# Patient Record
Sex: FEMALE | URBAN - METROPOLITAN AREA
[De-identification: names, ages, dates, MRNs, and addresses within clinical notes are randomized per-mention and may not be internally consistent; named-entity substitution may affect disease eponyms.]

---

## 2020-07-25 ENCOUNTER — TELEPHONE ENCOUNTER (OUTPATIENT)
Dept: URBAN - METROPOLITAN AREA CLINIC 13 | Facility: CLINIC | Age: 70
End: 2020-07-25

## 2020-07-25 RX ORDER — OMEPRAZOLE 40 MG/1
TAKE ONE CAPSULE BY MOUTH TWICE DAILY CAPSULE, DELAYED RELEASE ORAL
Qty: 180 | Refills: 3 | OUTPATIENT
Start: 2019-06-04 | End: 2019-08-15

## 2020-07-25 RX ORDER — CIPROFLOXACIN HYDROCHLORIDE 500 MG/1
TAKE 1 TABLET TWICE DAILY TABLET, FILM COATED ORAL
Qty: 20 | Refills: 0 | OUTPATIENT
Start: 2019-04-16 | End: 2019-05-07

## 2020-07-25 RX ORDER — METRONIDAZOLE 500 MG/1
TAKE 1 TABLET 3 TIMES DAILY TABLET ORAL
Qty: 30 | Refills: 0 | OUTPATIENT
Start: 2019-04-16 | End: 2019-05-07

## 2020-07-25 RX ORDER — DICYCLOMINE HYDROCHLORIDE 10 MG/1
TAKE 1 CAPSULE EVERY 6 HOURS AS NEEDED CAPSULE ORAL
Qty: 28 | Refills: 0 | OUTPATIENT
Start: 2019-04-16 | End: 2020-01-20

## 2020-07-25 RX ORDER — ESOMEPRAZOLE MAGNESIUM 40 MG/1
TAKE 1 CAPSULE BY MOUTH TWICE A DAY CAPSULE, DELAYED RELEASE PELLETS ORAL
Qty: 180 | Refills: 3 | OUTPATIENT
Start: 2019-08-14 | End: 2019-08-15

## 2020-07-25 RX ORDER — LOSARTAN POTASSIUM AND HYDROCHLOROTHIAZIDE 50; 12.5 MG/1; MG/1
TAKE 1 TABLET BY MOUTH EVERY DAY TABLET, FILM COATED ORAL
Qty: 90 | Refills: 0 | OUTPATIENT
Start: 2019-02-25 | End: 2019-12-17

## 2020-07-25 RX ORDER — BISOPROLOL FUMARATE AND HYDROCHLOROTHIAZIDE 6.25; 2.5 MG/1; MG/1
TAKE 1 TABLET DAILY TABLET ORAL
Refills: 0 | OUTPATIENT
Start: 2006-03-23 | End: 2010-11-02

## 2020-07-25 RX ORDER — OMEPRAZOLE 40 MG/1
TAKE 1 CAPSULE BY MOUTH TWICE A DAY CAPSULE, DELAYED RELEASE ORAL
Qty: 60 | Refills: 11 | OUTPATIENT
Start: 2019-05-07 | End: 2019-06-04

## 2020-07-25 RX ORDER — FERROUS FUMARATE 324(106)MG
TAKE 1 TABLET DAILY AS DIRECTED TABLET ORAL
Refills: 0 | OUTPATIENT
Start: 2007-10-05 | End: 2010-11-02

## 2020-07-26 ENCOUNTER — TELEPHONE ENCOUNTER (OUTPATIENT)
Dept: URBAN - METROPOLITAN AREA CLINIC 13 | Facility: CLINIC | Age: 70
End: 2020-07-26

## 2020-07-26 RX ORDER — ALLOPURINOL 300 MG/1
TAKE 1 TABLET BY MOUTH EVERY DAY INSTEAD OF ULORIC TABLET ORAL
Refills: 0 | Status: ACTIVE | COMMUNITY
Start: 2018-09-25

## 2020-07-26 RX ORDER — HYDROCODONE BITARTRATE AND ACETAMINOPHEN 5; 325 MG/1; MG/1
TAKE 1-2 TABLETS BY MOUTH EVERY 4-6 HOURS AS NEEDED FOR PAIN TABLET ORAL
Qty: 26 | Refills: 0 | Status: ACTIVE | COMMUNITY
Start: 2018-09-10

## 2020-07-26 RX ORDER — POTASSIUM CHLORIDE 1500 MG/1
TAKE 1 TABLET BY MOUTH EVERY DAY TABLET, EXTENDED RELEASE ORAL
Refills: 0 | Status: ACTIVE | COMMUNITY
Start: 2018-08-26

## 2020-07-26 RX ORDER — LEVOTHYROXINE SODIUM 0.09 MG/1
TAKE 1 TABLET BY MOUTH EVERY DAY TABLET ORAL
Qty: 90 | Refills: 0 | Status: ACTIVE | COMMUNITY
Start: 2019-11-13

## 2020-07-26 RX ORDER — SERTRALINE HYDROCHLORIDE 112 UG/1
TAKE 1 TABLET DAILY TABLET ORAL
Refills: 0 | Status: ACTIVE | COMMUNITY
Start: 2018-08-26

## 2020-07-26 RX ORDER — CYCLOBENZAPRINE HYDROCHLORIDE 10 MG/1
TAKE 1 TABLET BY MOUTH EVERY 8 HOURS AS NEEDED FOR MUSCLE SPASM TABLET, FILM COATED ORAL
Qty: 5 | Refills: 0 | Status: ACTIVE | COMMUNITY
Start: 2018-07-31

## 2020-07-26 RX ORDER — HYDROCODONE BITARTRATE AND ACETAMINOPHEN 5; 325 MG/1; MG/1
TAKE 1 TO 2 TABLETS BY MOUTH EVERY 4 TO 6 HOURS AS NEEDED FOR PAIN *IN TABLET ORAL
Qty: 26 | Refills: 0 | Status: ACTIVE | COMMUNITY
Start: 2018-08-15

## 2020-07-26 RX ORDER — OXYCODONE AND ACETAMINOPHEN 7.5; 325 MG/1; MG/1
TAKE 1 TABLET BY MOUTH EVERY 4 HOURS AS NEEDED PAIN TABLET ORAL
Qty: 26 | Refills: 0 | Status: ACTIVE | COMMUNITY
Start: 2018-07-09

## 2020-07-26 RX ORDER — DOCUSATE SODIUM 100 MG/1
TAKE ONE CAPSULE BY MOUTH EVERY DAY CAPSULE, LIQUID FILLED ORAL
Qty: 10 | Refills: 0 | Status: ACTIVE | COMMUNITY
Start: 2018-07-15

## 2020-07-26 RX ORDER — FUROSEMIDE 40 MG/1
TAKE 1 TABLET BY MOUTH TWICE A DAY TABLET ORAL
Refills: 0 | Status: ACTIVE | COMMUNITY
Start: 2018-03-19

## 2020-07-26 RX ORDER — LEVOTHYROXINE SODIUM 0.09 MG/1
TAKE 1 TABLET BY MOUTH EVERY DAY TABLET ORAL
Qty: 90 | Refills: 0 | Status: ACTIVE | COMMUNITY
Start: 2019-03-07

## 2020-07-26 RX ORDER — APIXABAN 5 MG/1
TAKE 1 TABLET TWO TIMES DAILY TABLET, FILM COATED ORAL
Refills: 0 | Status: ACTIVE | COMMUNITY
Start: 2018-09-25

## 2020-07-26 RX ORDER — MINOCYCLINE HYDROCHLORIDE 100 MG/1
TAKE 1 CAPSULE BY MOUTH EVERY DAY WITH FULL GLASS OF WATER CAPSULE ORAL
Qty: 30 | Refills: 0 | Status: ACTIVE | COMMUNITY
Start: 2019-03-04

## 2020-07-26 RX ORDER — CARISOPRODOL 350 MG/1
TAKE 1 TABLET BY MOUTH AT BEDTIME AS NEEDED TABLET ORAL
Qty: 30 | Refills: 0 | Status: ACTIVE | COMMUNITY
Start: 2018-07-09

## 2020-07-26 RX ORDER — HYDROCODONE BITARTRATE AND ACETAMINOPHEN 10; 325 MG/1; MG/1
TAKE 1 TABLET BY MOUTH 3 TIMES A DAY AS NEEDED FOR PAIN TABLET ORAL
Qty: 10 | Refills: 0 | Status: ACTIVE | COMMUNITY
Start: 2018-06-29

## 2020-07-26 RX ORDER — CEPHALEXIN 500 MG/1
TAKE 1 TABLET BY MOUTH 4 TIMES DAILY X 10 DAYS CAPSULE ORAL
Qty: 40 | Refills: 0 | Status: ACTIVE | COMMUNITY
Start: 2018-08-06

## 2020-07-26 RX ORDER — FEBUXOSTAT 80 MG/1
TAKE 1 TABLET BY MOUTH DAILY TABLET ORAL
Qty: 30 | Refills: 0 | Status: ACTIVE | COMMUNITY
Start: 2018-02-27

## 2020-07-26 RX ORDER — OXYCODONE AND ACETAMINOPHEN 5; 325 MG/1; MG/1
TAKE 1 TABLET BY MOUTH THREE TIMES A DAY AS NEEDED FOR PAIN TABLET ORAL
Qty: 10 | Refills: 0 | Status: ACTIVE | COMMUNITY
Start: 2018-06-25

## 2020-07-26 RX ORDER — HYDROCODONE BITARTRATE AND ACETAMINOPHEN 5; 325 MG/1; MG/1
TAKE 1 TABLET BY MOUTH EVERY 4 HOURS AS NEEDED FOR PAIN TABLET ORAL
Qty: 15 | Refills: 0 | Status: ACTIVE | COMMUNITY
Start: 2018-07-15

## 2020-07-26 RX ORDER — CHLORHEXIDINE GLUCONATE 4 %
TAKE 1 TABLET DAILY AS DIRECTED LIQUID (ML) TOPICAL
Refills: 0 | Status: ACTIVE | COMMUNITY

## 2020-07-26 RX ORDER — BENZONATATE 200 MG/1
TAKE 1 CAPSULE BY MOUTH THREE TIMES A DAY AS NEEDED CAPSULE ORAL
Qty: 60 | Refills: 0 | Status: ACTIVE | COMMUNITY
Start: 2019-10-10

## 2020-07-26 RX ORDER — BENZONATATE 100 MG/1
TAKE 1 TO 2 CAPSULES BY MOUTH 3 TIMES A DAY WITH THE LAST DOSE AT BEDT CAPSULE ORAL
Qty: 60 | Refills: 0 | Status: ACTIVE | COMMUNITY
Start: 2018-12-03

## 2020-07-26 RX ORDER — OXYCODONE AND ACETAMINOPHEN 5; 325 MG/1; MG/1
TAKE 1 TO 2 TABLETS BY MOUTH EVERY 4-6 HOURS AS NEEDED TABLET ORAL
Qty: 26 | Refills: 0 | Status: ACTIVE | COMMUNITY
Start: 2018-08-06

## 2020-07-26 RX ORDER — SULFAMETHOXAZOLE AND TRIMETHOPRIM 800; 160 MG/1; MG/1
TAKE 1 TABLET BY MOUTH TWICE A DAY X 10 DAYS TABLET ORAL
Qty: 20 | Refills: 0 | Status: ACTIVE | COMMUNITY
Start: 2018-08-06

## 2020-07-26 RX ORDER — SERTRALINE HYDROCHLORIDE 112 UG/1
TABLET ORAL
Qty: 30 | Refills: 0 | Status: ACTIVE | COMMUNITY
Start: 2019-09-10

## 2020-07-26 RX ORDER — PANTOPRAZOLE SODIUM 40 MG/1
TAKE 1 TABLET BY MOUTH TWICE A DAY TABLET, DELAYED RELEASE ORAL
Qty: 180 | Refills: 1 | Status: ACTIVE | COMMUNITY
Start: 2019-08-15

## 2020-07-26 RX ORDER — CARVEDILOL 25 MG/1
TAKE 1 TABLET BY MOUTH TWICE A DAY TABLET, FILM COATED ORAL
Qty: 180 | Refills: 0 | Status: ACTIVE | COMMUNITY
Start: 2018-09-25

## 2020-07-26 RX ORDER — EVOLOCUMAB 140 MG/ML
INJECTION, SOLUTION SUBCUTANEOUS
Qty: 2 | Refills: 0 | Status: ACTIVE | COMMUNITY
Start: 2019-03-19

## 2020-07-26 RX ORDER — NAPROXEN 500 MG/1
TABLET ORAL
Qty: 60 | Refills: 0 | Status: ACTIVE | COMMUNITY
Start: 2019-05-15

## 2020-07-26 RX ORDER — OSELTAMIVIR PHOSPHATE 75 MG/1
TAKE 1 CAPSULE BY MOUTH TWICE A DAY CAPSULE ORAL
Qty: 10 | Refills: 0 | Status: ACTIVE | COMMUNITY
Start: 2018-12-03

## 2020-07-26 RX ORDER — MINOCYCLINE HYDROCHLORIDE 100 MG/1
TAKE 1 CAPSULE BY MOUTH EVERY DAY CAPSULE ORAL
Qty: 30 | Refills: 0 | Status: ACTIVE | COMMUNITY
Start: 2018-06-07

## 2020-07-26 RX ORDER — LEVOTHYROXINE SODIUM 0.09 MG/1
TAKE 1 TABLET BY MOUTH EVERY DAY TABLET ORAL
Qty: 30 | Refills: 0 | Status: ACTIVE | COMMUNITY
Start: 2019-01-07

## 2021-02-10 ENCOUNTER — ERX REFILL RESPONSE (OUTPATIENT)
Age: 71
End: 2021-02-10

## 2021-02-10 RX ORDER — PANTOPRAZOLE SODIUM 40 MG/1
TAKE 1 TABLET BY MOUTH TWICE A DAY TABLET, DELAYED RELEASE ORAL
Qty: 180 | Refills: 0

## 2021-02-22 ENCOUNTER — ERX REFILL RESPONSE (OUTPATIENT)
Dept: URBAN - METROPOLITAN AREA CLINIC 113 | Facility: CLINIC | Age: 71
End: 2021-02-22

## 2021-02-22 RX ORDER — PANTOPRAZOLE SODIUM 40 MG/1
TAKE 1 TABLET BY MOUTH TWICE A DAY TABLET, DELAYED RELEASE ORAL
Qty: 180 | Refills: 0

## 2021-04-11 ENCOUNTER — TELEPHONE ENCOUNTER (OUTPATIENT)
Dept: URBAN - METROPOLITAN AREA CLINIC 113 | Facility: CLINIC | Age: 71
End: 2021-04-11

## 2021-04-15 ENCOUNTER — WEB ENCOUNTER (OUTPATIENT)
Dept: URBAN - METROPOLITAN AREA CLINIC 107 | Facility: CLINIC | Age: 71
End: 2021-04-15

## 2021-04-15 ENCOUNTER — OFFICE VISIT (OUTPATIENT)
Dept: URBAN - METROPOLITAN AREA CLINIC 107 | Facility: CLINIC | Age: 71
End: 2021-04-15
Payer: COMMERCIAL

## 2021-04-15 VITALS
DIASTOLIC BLOOD PRESSURE: 62 MMHG | HEART RATE: 78 BPM | HEIGHT: 64 IN | WEIGHT: 169 LBS | SYSTOLIC BLOOD PRESSURE: 95 MMHG | RESPIRATION RATE: 18 BRPM | TEMPERATURE: 97.7 F | BODY MASS INDEX: 28.85 KG/M2

## 2021-04-15 DIAGNOSIS — K22.70 BARRETT'S ESOPHAGUS WITHOUT DYSPLASIA: ICD-10-CM

## 2021-04-15 DIAGNOSIS — D64.89 ANEMIA DUE TO OTHER CAUSE: ICD-10-CM

## 2021-04-15 DIAGNOSIS — K21.9 GASTROESOPHAGEAL REFLUX DISEASE WITHOUT ESOPHAGITIS: ICD-10-CM

## 2021-04-15 DIAGNOSIS — K60.2 ANAL FISSURE: ICD-10-CM

## 2021-04-15 PROCEDURE — 99213 OFFICE O/P EST LOW 20 MIN: CPT | Performed by: NURSE PRACTITIONER

## 2021-04-15 RX ORDER — HYDROCODONE BITARTRATE AND ACETAMINOPHEN 10; 325 MG/1; MG/1
TAKE 1 TABLET BY MOUTH 3 TIMES A DAY AS NEEDED FOR PAIN TABLET ORAL
Qty: 10 | Refills: 0 | Status: DISCONTINUED | COMMUNITY
Start: 2018-06-29

## 2021-04-15 RX ORDER — BENZONATATE 200 MG/1
TAKE 1 CAPSULE BY MOUTH THREE TIMES A DAY AS NEEDED CAPSULE ORAL
Qty: 60 | Refills: 0 | Status: DISCONTINUED | COMMUNITY
Start: 2019-10-10

## 2021-04-15 RX ORDER — CARVEDILOL 25 MG/1
TAKE 1 TABLET BY MOUTH TWICE A DAY TABLET, FILM COATED ORAL
Qty: 180 | Refills: 0 | Status: ACTIVE | COMMUNITY
Start: 2018-09-25

## 2021-04-15 RX ORDER — OSELTAMIVIR PHOSPHATE 75 MG/1
TAKE 1 CAPSULE BY MOUTH TWICE A DAY CAPSULE ORAL
Qty: 10 | Refills: 0 | Status: DISCONTINUED | COMMUNITY
Start: 2018-12-03

## 2021-04-15 RX ORDER — PANTOPRAZOLE SODIUM 40 MG/1
TAKE 1 TABLET BY MOUTH TWICE A DAY TABLET, DELAYED RELEASE ORAL
Qty: 180 | Refills: 0 | Status: ACTIVE | COMMUNITY

## 2021-04-15 RX ORDER — MINOCYCLINE HYDROCHLORIDE 100 MG/1
TAKE 1 CAPSULE BY MOUTH EVERY DAY CAPSULE ORAL
Qty: 30 | Refills: 0 | Status: DISCONTINUED | COMMUNITY
Start: 2018-06-07

## 2021-04-15 RX ORDER — SULFAMETHOXAZOLE AND TRIMETHOPRIM 800; 160 MG/1; MG/1
TAKE 1 TABLET BY MOUTH TWICE A DAY X 10 DAYS TABLET ORAL
Qty: 20 | Refills: 0 | Status: DISCONTINUED | COMMUNITY
Start: 2018-08-06

## 2021-04-15 RX ORDER — POTASSIUM CHLORIDE 1.5 G/1.58G
1 PACKET WITH FOOD POWDER, FOR SOLUTION ORAL ONCE A DAY
Status: ACTIVE | COMMUNITY

## 2021-04-15 RX ORDER — CARISOPRODOL 350 MG/1
TAKE 1 TABLET BY MOUTH AT BEDTIME AS NEEDED TABLET ORAL
Qty: 30 | Refills: 0 | Status: DISCONTINUED | COMMUNITY
Start: 2018-07-09

## 2021-04-15 RX ORDER — CHLORHEXIDINE GLUCONATE 4 %
TAKE 1 TABLET DAILY AS DIRECTED LIQUID (ML) TOPICAL
Refills: 0 | Status: ACTIVE | COMMUNITY

## 2021-04-15 RX ORDER — POTASSIUM CHLORIDE 1500 MG/1
TAKE 1 TABLET BY MOUTH EVERY DAY TABLET, EXTENDED RELEASE ORAL
Refills: 0 | Status: DISCONTINUED | COMMUNITY
Start: 2018-08-26

## 2021-04-15 RX ORDER — OXYCODONE AND ACETAMINOPHEN 5; 325 MG/1; MG/1
TAKE 1 TABLET BY MOUTH THREE TIMES A DAY AS NEEDED FOR PAIN TABLET ORAL
Qty: 10 | Refills: 0 | Status: DISCONTINUED | COMMUNITY
Start: 2018-06-25

## 2021-04-15 RX ORDER — DOCUSATE SODIUM 100 MG/1
TAKE ONE CAPSULE BY MOUTH EVERY DAY CAPSULE, LIQUID FILLED ORAL
Qty: 10 | Refills: 0 | Status: DISCONTINUED | COMMUNITY
Start: 2018-07-15

## 2021-04-15 RX ORDER — FEBUXOSTAT 80 MG/1
TAKE 1 TABLET BY MOUTH DAILY TABLET ORAL
Qty: 30 | Refills: 0 | Status: DISCONTINUED | COMMUNITY
Start: 2018-02-27

## 2021-04-15 RX ORDER — SERTRALINE HYDROCHLORIDE 112 UG/1
TAKE 1 TABLET DAILY TABLET ORAL
Refills: 0 | Status: ACTIVE | COMMUNITY
Start: 2018-08-26

## 2021-04-15 RX ORDER — HYDROCODONE BITARTRATE AND ACETAMINOPHEN 5; 325 MG/1; MG/1
TAKE 1 TABLET BY MOUTH EVERY 4 HOURS AS NEEDED FOR PAIN TABLET ORAL
Qty: 15 | Refills: 0 | Status: DISCONTINUED | COMMUNITY
Start: 2018-07-15

## 2021-04-15 RX ORDER — APIXABAN 5 MG/1
TAKE 1 TABLET TWO TIMES DAILY TABLET, FILM COATED ORAL
Refills: 0 | Status: ACTIVE | COMMUNITY
Start: 2018-09-25

## 2021-04-15 RX ORDER — FUROSEMIDE 40 MG/1
TAKE 1 TABLET BY MOUTH TWICE A DAY TABLET ORAL
Refills: 0 | Status: ACTIVE | COMMUNITY
Start: 2018-03-19

## 2021-04-15 RX ORDER — CEPHALEXIN 500 MG/1
TAKE 1 TABLET BY MOUTH 4 TIMES DAILY X 10 DAYS CAPSULE ORAL
Qty: 40 | Refills: 0 | Status: DISCONTINUED | COMMUNITY
Start: 2018-08-06

## 2021-04-15 RX ORDER — BENZONATATE 100 MG/1
TAKE 1 TO 2 CAPSULES BY MOUTH 3 TIMES A DAY WITH THE LAST DOSE AT BEDT CAPSULE ORAL
Qty: 60 | Refills: 0 | Status: DISCONTINUED | COMMUNITY
Start: 2018-12-03

## 2021-04-15 RX ORDER — NAPROXEN 500 MG/1
TABLET ORAL
Qty: 60 | Refills: 0 | Status: DISCONTINUED | COMMUNITY
Start: 2019-05-15

## 2021-04-15 RX ORDER — EVOLOCUMAB 140 MG/ML
INJECTION, SOLUTION SUBCUTANEOUS
Qty: 2 | Refills: 0 | Status: DISCONTINUED | COMMUNITY
Start: 2019-03-19

## 2021-04-15 RX ORDER — CYCLOBENZAPRINE HYDROCHLORIDE 10 MG/1
TAKE 1 TABLET BY MOUTH EVERY 8 HOURS AS NEEDED FOR MUSCLE SPASM TABLET, FILM COATED ORAL
Qty: 5 | Refills: 0 | Status: DISCONTINUED | COMMUNITY
Start: 2018-07-31

## 2021-04-15 RX ORDER — OXYCODONE AND ACETAMINOPHEN 7.5; 325 MG/1; MG/1
TAKE 1 TABLET BY MOUTH EVERY 4 HOURS AS NEEDED PAIN TABLET ORAL
Qty: 26 | Refills: 0 | Status: ACTIVE | COMMUNITY
Start: 2018-07-09

## 2021-04-15 RX ORDER — ALLOPURINOL 300 MG/1
TAKE 1 TABLET BY MOUTH EVERY DAY INSTEAD OF ULORIC TABLET ORAL
Refills: 0 | Status: ACTIVE | COMMUNITY
Start: 2018-09-25

## 2021-04-15 RX ORDER — LEVOTHYROXINE SODIUM 0.09 MG/1
TAKE 1 TABLET BY MOUTH EVERY DAY TABLET ORAL
Qty: 30 | Refills: 0 | Status: DISCONTINUED | COMMUNITY
Start: 2019-01-07

## 2021-04-15 NOTE — PHYSICAL EXAM RECTAL:
tone decreased , Skin tags are present. , Anal fissure present. Mild prolapse noted with valsalva. , no red blood

## 2021-04-15 NOTE — HPI-OTHER HISTORIES
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  Antral biopsies demonstrated mild chemical/reactive gastropathy negative for intestinal metaplasia or H. pylori.  GE junction biopsies demonstrated specialized columnar epithelium (intestinal metaplasia) consistent with Ramos's esophagus.  There was no dysplasia.  Surveillance recommended in January 2023. Colonoscopy 7/16/2019:BBPS 6, normal terminal ileum, significant colon spasm, sigmoid/descending/transverse diverticulosis, patent end-to-end colocolonic anastomosis characterized by healthy-appearing mucosa, removal of 2 transverse 5 to 10 mm tubular adenoma and sessile serrated adenoma, removal of 2 sigmoid and descending 8 to 10 mm sessile serrated adenomas, piecemeal removal of a 12 mm rectal hyperplastic polyp.

## 2021-04-18 PROBLEM — 711150003: Status: ACTIVE | Noted: 2021-04-18

## 2021-04-18 PROBLEM — 30037006: Status: ACTIVE | Noted: 2021-04-18

## 2021-04-18 PROBLEM — 77880009: Status: ACTIVE | Noted: 2021-04-18

## 2021-04-18 PROBLEM — 111363006: Status: ACTIVE | Noted: 2021-04-18

## 2021-05-03 ENCOUNTER — OFFICE VISIT (OUTPATIENT)
Dept: URBAN - METROPOLITAN AREA CLINIC 113 | Facility: CLINIC | Age: 71
End: 2021-05-03

## 2021-05-27 ENCOUNTER — OFFICE VISIT (OUTPATIENT)
Dept: URBAN - METROPOLITAN AREA CLINIC 113 | Facility: CLINIC | Age: 71
End: 2021-05-27

## 2021-07-11 ENCOUNTER — ERX REFILL RESPONSE (OUTPATIENT)
Dept: URBAN - METROPOLITAN AREA CLINIC 113 | Facility: CLINIC | Age: 71
End: 2021-07-11

## 2021-07-11 RX ORDER — PANTOPRAZOLE SODIUM 40 MG/1
TAKE 1 TABLET BY MOUTH TWICE A DAY TABLET, DELAYED RELEASE ORAL
Qty: 180 TABLET | Refills: 1 | OUTPATIENT

## 2021-07-11 RX ORDER — PANTOPRAZOLE SODIUM 40 MG/1
TAKE 1 TABLET BY MOUTH TWICE A DAY TABLET, DELAYED RELEASE ORAL
Qty: 180 | Refills: 0 | OUTPATIENT

## 2021-10-04 ENCOUNTER — ERX REFILL RESPONSE (OUTPATIENT)
Dept: URBAN - METROPOLITAN AREA CLINIC 113 | Facility: CLINIC | Age: 71
End: 2021-10-04

## 2021-10-04 RX ORDER — PANTOPRAZOLE SODIUM 40 MG/1
TAKE 1 TABLET BY MOUTH TWICE A DAY TABLET, DELAYED RELEASE ORAL
Qty: 180 TABLET | Refills: 1 | OUTPATIENT

## 2021-10-04 RX ORDER — PANTOPRAZOLE SODIUM 40 MG/1
TAKE 1 TABLET BY MOUTH TWICE A DAY 90 TABLET, DELAYED RELEASE ORAL
Qty: 180 TABLET | Refills: 1 | OUTPATIENT

## 2021-11-02 ENCOUNTER — OFFICE VISIT (OUTPATIENT)
Dept: URBAN - METROPOLITAN AREA CLINIC 107 | Facility: CLINIC | Age: 71
End: 2021-11-02

## 2021-11-04 ENCOUNTER — WEB ENCOUNTER (OUTPATIENT)
Dept: URBAN - METROPOLITAN AREA CLINIC 107 | Facility: CLINIC | Age: 71
End: 2021-11-04

## 2021-11-04 ENCOUNTER — OFFICE VISIT (OUTPATIENT)
Dept: URBAN - METROPOLITAN AREA CLINIC 107 | Facility: CLINIC | Age: 71
End: 2021-11-04
Payer: COMMERCIAL

## 2021-11-04 VITALS
HEART RATE: 85 BPM | TEMPERATURE: 97.8 F | HEIGHT: 64 IN | BODY MASS INDEX: 27.83 KG/M2 | SYSTOLIC BLOOD PRESSURE: 165 MMHG | DIASTOLIC BLOOD PRESSURE: 93 MMHG | WEIGHT: 163 LBS

## 2021-11-04 DIAGNOSIS — K21.9 GASTROESOPHAGEAL REFLUX DISEASE WITHOUT ESOPHAGITIS: ICD-10-CM

## 2021-11-04 DIAGNOSIS — K22.70 BARRETT'S ESOPHAGUS WITHOUT DYSPLASIA: ICD-10-CM

## 2021-11-04 PROCEDURE — 99214 OFFICE O/P EST MOD 30 MIN: CPT | Performed by: NURSE PRACTITIONER

## 2021-11-04 RX ORDER — DENOSUMAB 60 MG/ML
AS DIRECTED INJECTION SUBCUTANEOUS
Status: ACTIVE | COMMUNITY

## 2021-11-04 RX ORDER — OXYCODONE AND ACETAMINOPHEN 7.5; 325 MG/1; MG/1
TAKE 1 TABLET BY MOUTH EVERY 4 HOURS AS NEEDED PAIN TABLET ORAL
Qty: 26 | Refills: 0 | Status: ON HOLD | COMMUNITY
Start: 2018-07-09

## 2021-11-04 RX ORDER — PANTOPRAZOLE SODIUM 40 MG/1
TAKE 1 TABLET BY MOUTH TWICE A DAY 90 TABLET, DELAYED RELEASE ORAL
Qty: 180 TABLET | Refills: 1 | Status: ACTIVE | COMMUNITY

## 2021-11-04 RX ORDER — FAMOTIDINE 40 MG/1
1 TABLET AT BEDTIME TABLET, FILM COATED ORAL ONCE A DAY
Status: ACTIVE | COMMUNITY

## 2021-11-04 RX ORDER — CHLORHEXIDINE GLUCONATE 4 %
TAKE 1 TABLET DAILY AS DIRECTED LIQUID (ML) TOPICAL
Refills: 0 | Status: ON HOLD | COMMUNITY

## 2021-11-04 RX ORDER — SERTRALINE HYDROCHLORIDE 112 UG/1
TAKE 1 TABLET DAILY TABLET ORAL
Refills: 0 | Status: ACTIVE | COMMUNITY
Start: 2018-08-26

## 2021-11-04 RX ORDER — PANTOPRAZOLE SODIUM 40 MG/1
TAKE 1 TABLET BY MOUTH TWICE A DAY 90 TABLET, DELAYED RELEASE ORAL
OUTPATIENT

## 2021-11-04 RX ORDER — ALLOPURINOL 300 MG/1
TAKE 1 TABLET BY MOUTH EVERY DAY INSTEAD OF ULORIC TABLET ORAL
Refills: 0 | Status: ACTIVE | COMMUNITY
Start: 2018-09-25

## 2021-11-04 RX ORDER — FAMOTIDINE 40 MG/1
1 TABLET AT BEDTIME TABLET, FILM COATED ORAL ONCE A DAY
OUTPATIENT

## 2021-11-04 RX ORDER — CARVEDILOL 25 MG/1
TAKE 1 TABLET BY MOUTH TWICE A DAY TABLET, FILM COATED ORAL
Qty: 180 | Refills: 0 | Status: ACTIVE | COMMUNITY
Start: 2018-09-25

## 2021-11-04 RX ORDER — POTASSIUM CHLORIDE 1.5 G/1.58G
1 PACKET WITH FOOD POWDER, FOR SOLUTION ORAL ONCE A DAY
Status: ACTIVE | COMMUNITY

## 2021-11-04 RX ORDER — FUROSEMIDE 40 MG/1
TAKE 1 TABLET BY MOUTH TWICE A DAY TABLET ORAL
Refills: 0 | Status: ACTIVE | COMMUNITY
Start: 2018-03-19

## 2021-11-04 RX ORDER — ESOMEPRAZOLE MAGNESIUM 40 MG/1
1 CAPSULE CAPSULE, DELAYED RELEASE ORAL TWICE DAILY
Qty: 60 | Refills: 6 | OUTPATIENT
Start: 2021-11-04

## 2021-11-04 RX ORDER — APIXABAN 5 MG/1
TAKE 1 TABLET TWO TIMES DAILY TABLET, FILM COATED ORAL
Refills: 0 | Status: ACTIVE | COMMUNITY
Start: 2018-09-25

## 2021-11-04 NOTE — HPI-TODAY'S VISIT:
71-year-old female with a history of CVA, atrial fibrillation status post ablation on Eliquis, GERD, nondysplastic short segment Ramos's, gas and bloating, diarrhea, adenomatous colon polyps due for surveillance in 2022, presenting for evaluation of increasing acid reflux. She was seen in the office on 4/15/2021 with complaints of hemorrhoidal irritation consistent with perianal swelling and proctalgia.  There was one isolated episode of red blood per rectum.  She described proctalgia associated with sitting, walking and with a bowel movement.  This was associated with an anal fissure and anal prolapse.  She was recommended to increase Benefiber to 2 tablespoons daily.  She reported abnormal labs performed preoperatively a week prior to that visit, and was ultimately started on oral iron.  Labs were requested for review.  Regarding her history of Ramos's esophagus, she was noted to be due for surveillance EGD in 2023.  Her last EGD was performed 1/20/2020.  There were esophageal mucosal changes secondary to established short segment Ramos's disease which was biopsied, a large hiatal hernia, erythematous mucosa in the antrum status post biopsies, and normal examined duodenum.  She was to continue pantoprazole 40 mg twice daily.  She tells me that she had a Watchman procedure 3 weeks ago, and has been having increasing reflux. She stopped the pantoprazole and pepcid in favor of over the counter Nexium. Over the counter seems to improve her heartburn better than pantoprazole and pepcid. There is no nausea or vomiting. There is no dysphagia. There is only a burning indigestion in the chest. She also describes an atypical chest pain that would improve with belching.  This chest pain is resolved. She has bowel movements daily without blood or melena.

## 2021-12-21 ENCOUNTER — OFFICE VISIT (OUTPATIENT)
Dept: URBAN - METROPOLITAN AREA CLINIC 107 | Facility: CLINIC | Age: 71
End: 2021-12-21

## 2022-01-03 ENCOUNTER — ERX REFILL RESPONSE (OUTPATIENT)
Dept: URBAN - METROPOLITAN AREA CLINIC 113 | Facility: CLINIC | Age: 72
End: 2022-01-03

## 2022-01-03 RX ORDER — PANTOPRAZOLE SODIUM 40 MG/1
TAKE 1 TABLET BY MOUTH TWICE A DAY 90 TABLET, DELAYED RELEASE ORAL
Qty: 180 TABLET | Refills: 4 | OUTPATIENT

## 2022-01-03 RX ORDER — PANTOPRAZOLE SODIUM 40 MG/1
TAKE 1 TABLET BY MOUTH TWICE A DAY 90 TABLET, DELAYED RELEASE ORAL
Qty: 180 TABLET | Refills: 0 | OUTPATIENT

## 2022-09-28 ENCOUNTER — OFFICE VISIT (OUTPATIENT)
Dept: URBAN - METROPOLITAN AREA CLINIC 107 | Facility: CLINIC | Age: 72
End: 2022-09-28

## 2022-12-05 ENCOUNTER — LAB OUTSIDE AN ENCOUNTER (OUTPATIENT)
Dept: URBAN - METROPOLITAN AREA CLINIC 107 | Facility: CLINIC | Age: 72
End: 2022-12-05

## 2022-12-05 ENCOUNTER — OFFICE VISIT (OUTPATIENT)
Dept: URBAN - METROPOLITAN AREA CLINIC 107 | Facility: CLINIC | Age: 72
End: 2022-12-05
Payer: COMMERCIAL

## 2022-12-05 VITALS
BODY MASS INDEX: 27.66 KG/M2 | HEART RATE: 87 BPM | HEIGHT: 64 IN | TEMPERATURE: 97.6 F | WEIGHT: 162 LBS | RESPIRATION RATE: 18 BRPM | DIASTOLIC BLOOD PRESSURE: 91 MMHG | SYSTOLIC BLOOD PRESSURE: 133 MMHG

## 2022-12-05 DIAGNOSIS — R15.2 FECAL URGENCY: ICD-10-CM

## 2022-12-05 DIAGNOSIS — D12.6 COLON ADENOMA: ICD-10-CM

## 2022-12-05 DIAGNOSIS — K21.9 GASTROESOPHAGEAL REFLUX DISEASE WITHOUT ESOPHAGITIS: ICD-10-CM

## 2022-12-05 DIAGNOSIS — K22.70 BARRETT'S ESOPHAGUS WITHOUT DYSPLASIA: ICD-10-CM

## 2022-12-05 PROBLEM — 142251000119102: Status: ACTIVE | Noted: 2022-12-05

## 2022-12-05 PROCEDURE — 99214 OFFICE O/P EST MOD 30 MIN: CPT | Performed by: NURSE PRACTITIONER

## 2022-12-05 RX ORDER — CARVEDILOL 25 MG/1
TAKE 1 TABLET BY MOUTH TWICE A DAY TABLET, FILM COATED ORAL TWICE A DAY
Refills: 0 | Status: ACTIVE | COMMUNITY
Start: 2018-09-25

## 2022-12-05 RX ORDER — GABAPENTIN 100 MG/1
1 CAPSULE CAPSULE ORAL ONCE A DAY
Status: ACTIVE | COMMUNITY

## 2022-12-05 RX ORDER — POTASSIUM CHLORIDE 1.5 G/1.58G
1 PACKET WITH FOOD POWDER, FOR SOLUTION ORAL ONCE A DAY
Status: ACTIVE | COMMUNITY

## 2022-12-05 RX ORDER — FUROSEMIDE 40 MG/1
TAKE 1 TABLET BY MOUTH TWICE A DAY TABLET ORAL ONCE A DAY
Refills: 0 | Status: ACTIVE | COMMUNITY
Start: 2018-03-19

## 2022-12-05 RX ORDER — FAMOTIDINE 40 MG/1
1 TABLET AT BEDTIME TABLET, FILM COATED ORAL ONCE A DAY
Status: ACTIVE | COMMUNITY

## 2022-12-05 RX ORDER — PANTOPRAZOLE SODIUM 40 MG/1
TAKE 1 TABLET BY MOUTH TWICE A DAY 90 TABLET, DELAYED RELEASE ORAL
OUTPATIENT

## 2022-12-05 RX ORDER — PANTOPRAZOLE SODIUM 40 MG/1
TAKE 1 TABLET BY MOUTH TWICE A DAY 90 TABLET, DELAYED RELEASE ORAL
Qty: 180 TABLET | Refills: 4 | Status: ACTIVE | COMMUNITY

## 2022-12-05 RX ORDER — LEVOTHYROXINE SODIUM 100 UG/1
TAKE 1 TABLET DAILY TABLET ORAL
Refills: 0 | Status: ACTIVE | COMMUNITY
Start: 2018-08-26

## 2022-12-05 RX ORDER — CEFTRIAXONE 500 MG/1
AS DIRECTED INJECTION, POWDER, FOR SOLUTION INTRAMUSCULAR; INTRAVENOUS
Status: ACTIVE | COMMUNITY

## 2022-12-05 RX ORDER — ALLOPURINOL 300 MG/1
TAKE 1 TABLET BY MOUTH EVERY DAY INSTEAD OF ULORIC TABLET ORAL
Refills: 0 | Status: ACTIVE | COMMUNITY
Start: 2018-09-25

## 2022-12-05 RX ORDER — ESOMEPRAZOLE MAGNESIUM 20 MG/1
1 CAPSULE CAPSULE, DELAYED RELEASE ORAL ONCE A DAY
Qty: 30 | OUTPATIENT
Start: 2022-12-05

## 2022-12-05 RX ORDER — FAMOTIDINE 40 MG/1
1 TABLET AT BEDTIME TABLET, FILM COATED ORAL ONCE A DAY
OUTPATIENT

## 2022-12-05 NOTE — HPI-TODAY'S VISIT:
This is a 72-year-old woman with a history of esophageal reflux, and personal history of Ramos's esophagus presenting to discuss a surveillance EGD. She was seen in the office last year with complaints of refractory acid reflux.  Pantoprazole was switched back to Nexium 40 mg daily as she had previously experienced good result with this medication.  Her last EGD was in 2020 performed for Ramos's surveillance.  Esophageal mucosal changes secondary to established short segment Ramos's disease were noted status post biopsies.  There was a large hiatal hernia with one third of the stomach above the diaphragm.  There is erythematous mucosa in the antrum status post biopsies.  There was a normal examined duodenum.  Esophagus biopsies were consistent with Ramos's esophagus without presence of dysplasia or malignancy.  Her last colonoscopy was performed 2019.  This was notable for a good bowel preparation, normal examined ileum, significant colon spasm, diverticulosis in the sigmoid, descending and transverse colon.  The distal rectum and anal verge were normal on retroflexion view.  There was a patent end-to-end colocolonic anastomosis characterized by healthy-appearing mucosa.  (2) 5 to 10 mm polyps were removed from the transverse colon.  (2) 8 to 10 mm polyps were removed from the sigmoid and descending colon.  A 12 mm polyp was removed from the rectum.  Pathology revealed tubular adenoma and sessile serrated adenomas.  A repeat colonoscopy was recommended in 3 years, due in 2022.  She is wearing a neck brace today. She tells me that after her  , she moved to [place] to live with her daughter. She was walking up the stairs, and dropped her water. She turned around to look at her dropped water bottle, and then toppled down the fifteen stairs. She broke C1-2. She has been wearing the neck brace since August. She had imaging in November that showed the fractures are healing but not yet completely healed. She is to continue wearing the neck brace through . She tells me that she also has pneumonia currently. She is following with a Dr. Pacheco in [place], [place] for her neck.  She is complaining of worsening heartburn and worsening hoarseness. She is taking pantoprazole 40 mg daily. She continues to express that she had better control of her heartburn on Nexium. She is not sure why her prescription was changed back to pantoprazole. There is no dysphagia. There is no nausea or vomiting. No abdominal pain. She has bowel movements daily. She tells me that her stools are loose, and she constantly has some fecal incontinence. She has tried Align probiotic without improvement. She is on Benefiber without any improvement.  There is no blood per rectum. She admits recent use of rocephin and azithromycin for pneumonia. No fever or chills. Stools are typically postprandial.

## 2022-12-07 ENCOUNTER — TELEPHONE ENCOUNTER (OUTPATIENT)
Dept: URBAN - METROPOLITAN AREA CLINIC 113 | Facility: CLINIC | Age: 72
End: 2022-12-07

## 2023-01-16 PROBLEM — 302914006: Status: ACTIVE | Noted: 2021-04-18

## 2023-01-16 PROBLEM — 266435005: Status: ACTIVE | Noted: 2021-04-18

## 2023-02-13 ENCOUNTER — TELEPHONE ENCOUNTER (OUTPATIENT)
Dept: URBAN - METROPOLITAN AREA CLINIC 107 | Facility: CLINIC | Age: 73
End: 2023-02-13

## 2023-03-26 ENCOUNTER — DASHBOARD ENCOUNTERS (OUTPATIENT)
Age: 73
End: 2023-03-26

## 2023-03-27 ENCOUNTER — OFFICE VISIT (OUTPATIENT)
Dept: URBAN - METROPOLITAN AREA CLINIC 107 | Facility: CLINIC | Age: 73
End: 2023-03-27

## 2023-03-27 RX ORDER — ESOMEPRAZOLE MAGNESIUM 20 MG/1
1 CAPSULE CAPSULE, DELAYED RELEASE ORAL ONCE A DAY
Qty: 30 | OUTPATIENT

## 2023-03-27 RX ORDER — FAMOTIDINE 40 MG/1
1 TABLET AT BEDTIME TABLET, FILM COATED ORAL ONCE A DAY
OUTPATIENT

## 2023-03-27 NOTE — HPI-TODAY'S VISIT:
This is a 73-year-old woman with a history of esophageal reflux, and personal history of Ramos's esophagus presenting to discuss a surveillance EGD. She was seen in the office last year with complaints of refractory acid reflux.  Pantoprazole was switched back to Nexium 40 mg daily as she had previously experienced good result with this medication.  Her last EGD was in 2020 performed for Ramos's surveillance.  Esophageal mucosal changes secondary to established short segment Ramos's disease were noted status post biopsies.  There was a large hiatal hernia with one third of the stomach above the diaphragm.  There is erythematous mucosa in the antrum status post biopsies.  There was a normal examined duodenum.  Esophagus biopsies were consistent with Ramos's esophagus without presence of dysplasia or malignancy.  Her last colonoscopy was performed 2019.  This was notable for a good bowel preparation, normal examined ileum, significant colon spasm, diverticulosis in the sigmoid, descending and transverse colon.  The distal rectum and anal verge were normal on retroflexion view.  There was a patent end-to-end colocolonic anastomosis characterized by healthy-appearing mucosa.  (2) 5 to 10 mm polyps were removed from the transverse colon.  (2) 8 to 10 mm polyps were removed from the sigmoid and descending colon.  A 12 mm polyp was removed from the rectum.  Pathology revealed tubular adenoma and sessile serrated adenomas.  A repeat colonoscopy was recommended in 3 years, due in 2022.  She is wearing a neck brace today. She tells me that after her  , she moved to [place] to live with her daughter. She was walking up the stairs, and dropped her water. She turned around to look at her dropped water bottle, and then toppled down the fifteen stairs. She broke C1-2. She has been wearing the neck brace since August. She had imaging in November that showed the fractures are healing but not yet completely healed. She is to continue wearing the neck brace through . She tells me that she also has pneumonia currently. She is following with a Dr. Pacheco in [place], [place] for her neck.  She is complaining of worsening heartburn and worsening hoarseness. She is taking pantoprazole 40 mg daily. She continues to express that she had better control of her heartburn on Nexium. She is not sure why her prescription was changed back to pantoprazole. There is no dysphagia. There is no nausea or vomiting. No abdominal pain. She has bowel movements daily. She tells me that her stools are loose, and she constantly has some fecal incontinence. She has tried Align probiotic without improvement. She is on Benefiber without any improvement.  There is no blood per rectum. She admits recent use of rocephin and azithromycin for pneumonia. No fever or chills. Stools are typically postprandial.

## 2023-06-29 ENCOUNTER — OFFICE VISIT (OUTPATIENT)
Dept: URBAN - METROPOLITAN AREA MEDICAL CENTER 19 | Facility: MEDICAL CENTER | Age: 73
End: 2023-06-29
Payer: COMMERCIAL

## 2023-06-29 DIAGNOSIS — K22.70 BARRETT ESOPHAGUS: ICD-10-CM

## 2023-06-29 DIAGNOSIS — K21.9 ACID REFLUX: ICD-10-CM

## 2023-06-29 DIAGNOSIS — Z98.0 INTESTINAL ANASTOMOSIS PRESENT: ICD-10-CM

## 2023-06-29 DIAGNOSIS — Z86.010 COLON POLYP HISTORY: ICD-10-CM

## 2023-06-29 DIAGNOSIS — D12.0 ADENOMA OF CECUM: ICD-10-CM

## 2023-06-29 PROCEDURE — 43239 EGD BIOPSY SINGLE/MULTIPLE: CPT | Performed by: INTERNAL MEDICINE

## 2023-06-29 PROCEDURE — 45385 COLONOSCOPY W/LESION REMOVAL: CPT | Performed by: INTERNAL MEDICINE

## 2023-08-02 NOTE — PHYSICAL EXAM GASTROINTESTINAL
Abdomen , soft, nontender, nondistended , no guarding or rigidity , no masses palpable , normal bowel sounds , Liver and Spleen , no hepatosplenomegaly
No